# Patient Record
Sex: FEMALE | URBAN - METROPOLITAN AREA
[De-identification: names, ages, dates, MRNs, and addresses within clinical notes are randomized per-mention and may not be internally consistent; named-entity substitution may affect disease eponyms.]

---

## 2022-06-14 ENCOUNTER — APPOINTMENT (RX ONLY)
Dept: URBAN - METROPOLITAN AREA CLINIC 119 | Facility: CLINIC | Age: 27
Setting detail: DERMATOLOGY
End: 2022-06-14

## 2022-06-14 DIAGNOSIS — Z41.9 ENCOUNTER FOR PROCEDURE FOR PURPOSES OTHER THAN REMEDYING HEALTH STATE, UNSPECIFIED: ICD-10-CM

## 2022-06-14 PROCEDURE — ? FILLERS

## 2022-06-14 ASSESSMENT — LOCATION SIMPLE DESCRIPTION DERM
LOCATION SIMPLE: LEFT UPPER LIP
LOCATION SIMPLE: LEFT LIP
LOCATION SIMPLE: RIGHT UPPER LIP
LOCATION SIMPLE: RIGHT LOWER LIP
LOCATION SIMPLE: LEFT LOWER LIP

## 2022-06-14 ASSESSMENT — LOCATION DETAILED DESCRIPTION DERM
LOCATION DETAILED: RIGHT INFERIOR VERMILION BORDER
LOCATION DETAILED: LEFT UPPER CUTANEOUS LIP
LOCATION DETAILED: LEFT SUPERIOR VERMILION BORDER
LOCATION DETAILED: LEFT INFERIOR VERMILION BORDER
LOCATION DETAILED: RIGHT SUPERIOR VERMILION BORDER

## 2022-06-14 ASSESSMENT — LOCATION ZONE DERM: LOCATION ZONE: LIP

## 2022-06-14 NOTE — PROCEDURE: FILLERS
[facility]erm[facility] Lips Filler Volume In Cc: 0
Number Of Syringes (Required For Inventory): 1
Use Map Statement For Sites (Optional): No
Consent: Written consent obtained. Risks include but not limited to bruising, beading, irregular texture, ulceration, infection, allergic reaction, scar formation, incomplete augmentation, temporary nature, procedural pain.
Post-Care Instructions: Patient instructed to apply ice to reduce swelling.
Inventory Information: This plan will send filler information to inventory based on the fillers you select. Multiple fillers can be sent but you must ensure you select the appropriate fillers in the inventory plan.
Map Statment: See [address] for Complete Details
Additional Area 1 Location: oral commissures
Detail Level: Detailed
Filler: Restylane Kysse
Show Inventory Tab: Show

## 2022-07-26 ENCOUNTER — APPOINTMENT (RX ONLY)
Dept: URBAN - METROPOLITAN AREA CLINIC 116 | Facility: CLINIC | Age: 27
Setting detail: DERMATOLOGY
End: 2022-07-26

## 2022-07-26 DIAGNOSIS — L81.8 OTHER SPECIFIED DISORDERS OF PIGMENTATION: ICD-10-CM

## 2022-07-26 PROCEDURE — ? LASER TATTOO REMOVAL

## 2022-07-26 PROCEDURE — ? DIAGNOSIS COMMENT

## 2022-07-26 ASSESSMENT — LOCATION SIMPLE DESCRIPTION DERM: LOCATION SIMPLE: RIGHT PRETIBIAL REGION

## 2022-07-26 ASSESSMENT — LOCATION ZONE DERM: LOCATION ZONE: LEG

## 2022-07-26 ASSESSMENT — LOCATION DETAILED DESCRIPTION DERM: LOCATION DETAILED: RIGHT LATERAL DISTAL PRETIBIAL REGION

## 2022-07-26 NOTE — HPI: COSMETIC (LASER TATTOO REMOVAL)
Have You Had Laser Tattoo Removal Before?: has not had previous treatments
When Outside In The Sun, Do You...: rarely burns, mostly tans

## 2022-07-26 NOTE — PROCEDURE: LASER TATTOO REMOVAL
Price (Use Numbers Only, No Special Characters Or $): 0
Detail Level: Detailed
Tattoo Color Treated: Black
Treatment Number: 1
Laser Type: Q-switched Nd:Yag 1064nm
Spot Size In Mm: 6
Fluence: 2.5
Anesthesia Type: 1% lidocaine with epinephrine
Anesthesia Volume In Cc: 3
Pre-Procedure Text: The treatment areas were cleaned and treated with the laser parameters noted above.
Post-Procedure Text: Vaseline and ice applied. Post care reviewed with patient.
Endpoint Text: Immediate endpoint: skin whitening and pinpoint bleeding.
Spot Size In Mm: 2
Laser Type: Q-switched Alexandrite 755nm
Consent: Written consent obtained, risks reviewed including but not limited to crusting, scabbing, blistering, scarring, darker or lighter pigmentary change, systemic reactions, ulceration, incidental hair removal, bruising, and/or incomplete removal.
Post-Care Instructions: I reviewed with the patient in detail post-care instructions. Patient should apply vaseline twice daily to tattoo and keep it covered with a non-stick adhesive dressing.

## 2022-08-02 ENCOUNTER — APPOINTMENT (RX ONLY)
Dept: URBAN - METROPOLITAN AREA CLINIC 119 | Facility: CLINIC | Age: 27
Setting detail: DERMATOLOGY
End: 2022-08-02

## 2022-08-02 DIAGNOSIS — Z41.9 ENCOUNTER FOR PROCEDURE FOR PURPOSES OTHER THAN REMEDYING HEALTH STATE, UNSPECIFIED: ICD-10-CM

## 2022-08-02 PROCEDURE — ? DERMAPLANE

## 2022-08-02 PROCEDURE — ? DELUXE HYDRAFACIAL

## 2022-08-02 ASSESSMENT — LOCATION DETAILED DESCRIPTION DERM
LOCATION DETAILED: RIGHT INFERIOR LATERAL FOREHEAD
LOCATION DETAILED: INFERIOR MID FOREHEAD

## 2022-08-02 ASSESSMENT — LOCATION ZONE DERM: LOCATION ZONE: FACE

## 2022-08-02 ASSESSMENT — LOCATION SIMPLE DESCRIPTION DERM
LOCATION SIMPLE: INFERIOR FOREHEAD
LOCATION SIMPLE: RIGHT FOREHEAD

## 2022-08-02 NOTE — PROCEDURE: DERMAPLANE
Blade: 10 blade scalpel
Treatment Areas: face
Treatment Area Prep: acetone
Post-Procedure Instructions: Following the dermaplane procedure, Oxymist treatment was applied to the treatment areas. Moisturizer and SPF was applied.
Pre-Procedure Text: The patient was placed in a recumbant position on the procedure table.
Detail Level: Zone
Post-Care Instructions: I reviewed with the patient in detail post-care instructions.

## 2022-08-02 NOTE — PROCEDURE: DELUXE HYDRAFACIAL
Indication: skin texture
Location: face
Procedure: Exfoliation
Treatment Number: 1
Solution: Activ-4
Tip: Hydropeel Tip, Blue
Vacuum Pressure Low Setting (Will Not Render If Set To 0): 0
Procedure: Peel
Solution: GlySal 7.5%
Tip: Hydropeel Tip, Teal
Procedure: Extraction
Solution: Beta-HD
Procedure: Boost
Solution Override
Tip: Hydropeel Tip, Clear
Procedure: Fusion
Procedure: Extend and Protect
Tip Override
Consent: Written consent obtained, risks reviewed including but not limited to crusting, scabbing, blistering, scarring, darker or lighter pigmentary change, bruising, and/or incomplete response.
Post-Care Instructions: I reviewed with the patient in detail post-care instructions. Patient should stay away from the sun and wear sun protection until treated areas are fully healed.

## 2022-08-17 ENCOUNTER — APPOINTMENT (RX ONLY)
Dept: URBAN - METROPOLITAN AREA CLINIC 119 | Facility: CLINIC | Age: 27
Setting detail: DERMATOLOGY
End: 2022-08-17

## 2022-08-24 ENCOUNTER — APPOINTMENT (RX ONLY)
Dept: URBAN - METROPOLITAN AREA CLINIC 119 | Facility: CLINIC | Age: 27
Setting detail: DERMATOLOGY
End: 2022-08-24

## 2022-09-20 ENCOUNTER — APPOINTMENT (RX ONLY)
Dept: URBAN - METROPOLITAN AREA CLINIC 119 | Facility: CLINIC | Age: 27
Setting detail: DERMATOLOGY
End: 2022-09-20

## 2022-09-20 DIAGNOSIS — L81.8 OTHER SPECIFIED DISORDERS OF PIGMENTATION: ICD-10-CM

## 2022-09-20 PROCEDURE — ? DIAGNOSIS COMMENT

## 2022-09-20 PROCEDURE — ? LASER TATTOO REMOVAL

## 2022-09-20 ASSESSMENT — LOCATION DETAILED DESCRIPTION DERM: LOCATION DETAILED: RIGHT LATERAL DISTAL PRETIBIAL REGION

## 2022-09-20 ASSESSMENT — LOCATION SIMPLE DESCRIPTION DERM: LOCATION SIMPLE: RIGHT PRETIBIAL REGION

## 2022-09-20 ASSESSMENT — LOCATION ZONE DERM: LOCATION ZONE: LEG

## 2022-09-20 NOTE — PROCEDURE: LASER TATTOO REMOVAL
Price (Use Numbers Only, No Special Characters Or $): 0
Detail Level: Detailed
Tattoo Color Treated: Black
Treatment Number: 2
Laser Type: Q-switched Nd:Yag 1064nm
Spot Size In Mm: 4
Fluence: 6
Anesthesia Type: 1% lidocaine with epinephrine
Pre-Procedure Text: The treatment areas were cleaned and treated with the laser parameters noted above.
Post-Procedure Text: Vaseline and ice applied. Post care reviewed with patient.
Endpoint Text: Immediate endpoint: skin whitening and pinpoint bleeding.
Laser Type: Q-switched Alexandrite 755nm
Consent: Written consent obtained, risks reviewed including but not limited to crusting, scabbing, blistering, scarring, darker or lighter pigmentary change, systemic reactions, ulceration, incidental hair removal, bruising, and/or incomplete removal.
Post-Care Instructions: I reviewed with the patient in detail post-care instructions. Patient should apply vaseline twice daily to tattoo and keep it covered with a non-stick adhesive dressing.

## 2023-01-03 ENCOUNTER — RX ONLY (OUTPATIENT)
Age: 28
Setting detail: RX ONLY
End: 2023-01-03

## 2023-01-03 RX ORDER — TRETIONIN 0.5 MG/G
CREAM TOPICAL
Qty: 45 | Refills: 3 | Status: ERX | COMMUNITY
Start: 2023-01-03

## 2023-01-17 ENCOUNTER — APPOINTMENT (RX ONLY)
Dept: URBAN - METROPOLITAN AREA CLINIC 119 | Facility: CLINIC | Age: 28
Setting detail: DERMATOLOGY
End: 2023-01-17

## 2023-01-17 DIAGNOSIS — L81.8 OTHER SPECIFIED DISORDERS OF PIGMENTATION: ICD-10-CM

## 2023-01-17 PROCEDURE — ? LASER TATTOO REMOVAL

## 2023-01-17 PROCEDURE — ? DIAGNOSIS COMMENT

## 2023-01-17 ASSESSMENT — LOCATION SIMPLE DESCRIPTION DERM: LOCATION SIMPLE: RIGHT PRETIBIAL REGION

## 2023-01-17 ASSESSMENT — LOCATION DETAILED DESCRIPTION DERM: LOCATION DETAILED: RIGHT LATERAL DISTAL PRETIBIAL REGION

## 2023-01-17 ASSESSMENT — LOCATION ZONE DERM: LOCATION ZONE: LEG

## 2023-01-17 NOTE — PROCEDURE: LASER TATTOO REMOVAL
Price (Use Numbers Only, No Special Characters Or $): 0
Detail Level: Detailed
Tattoo Color Treated: Black
Treatment Number: 3
Laser Type: Q-switched Nd:Yag 1064nm
Spot Size In Mm: 4
Fluence: 6.2
Anesthesia Type: 1% lidocaine with epinephrine
Anesthesia Volume In Cc: 6
Pre-Procedure Text: The treatment areas were cleaned and treated with the laser parameters noted above.
Post-Procedure Text: Vaseline and ice applied. Post care reviewed with patient.
Endpoint Text: Immediate endpoint: skin whitening and pinpoint bleeding.
Spot Size In Mm: 2
Laser Type: Q-switched Alexandrite 755nm
Consent: Written consent obtained, risks reviewed including but not limited to crusting, scabbing, blistering, scarring, darker or lighter pigmentary change, systemic reactions, ulceration, incidental hair removal, bruising, and/or incomplete removal.
Post-Care Instructions: I reviewed with the patient in detail post-care instructions. Patient should apply vaseline twice daily to tattoo and keep it covered with a non-stick adhesive dressing.

## 2023-04-24 ENCOUNTER — RX ONLY (OUTPATIENT)
Age: 28
Setting detail: RX ONLY
End: 2023-04-24

## 2023-04-24 RX ORDER — KETOCONAZOLE 20 MG/ML
SHAMPOO, SUSPENSION TOPICAL
Qty: 120 | Refills: 5 | Status: ERX | COMMUNITY
Start: 2023-04-24

## 2023-05-09 ENCOUNTER — APPOINTMENT (RX ONLY)
Dept: URBAN - METROPOLITAN AREA CLINIC 119 | Facility: CLINIC | Age: 28
Setting detail: DERMATOLOGY
End: 2023-05-09

## 2023-05-09 DIAGNOSIS — Z41.9 ENCOUNTER FOR PROCEDURE FOR PURPOSES OTHER THAN REMEDYING HEALTH STATE, UNSPECIFIED: ICD-10-CM

## 2023-05-09 PROCEDURE — ? INVENTORY

## 2023-05-09 PROCEDURE — ? BOTOX (U OR CC)

## 2023-05-09 ASSESSMENT — LOCATION DETAILED DESCRIPTION DERM
LOCATION DETAILED: LEFT FOREHEAD
LOCATION DETAILED: LEFT SUPERIOR LATERAL MALAR CHEEK
LOCATION DETAILED: LEFT SUPERIOR VERMILION LIP
LOCATION DETAILED: GLABELLA
LOCATION DETAILED: LEFT INFERIOR VERMILION BORDER
LOCATION DETAILED: RIGHT MEDIAL FOREHEAD
LOCATION DETAILED: SUPERIOR MID FOREHEAD
LOCATION DETAILED: LEFT INFERIOR MEDIAL FOREHEAD
LOCATION DETAILED: RIGHT SUPERIOR FOREHEAD
LOCATION DETAILED: RIGHT INFERIOR MEDIAL FOREHEAD
LOCATION DETAILED: RIGHT SUPERIOR LATERAL MALAR CHEEK
LOCATION DETAILED: RIGHT SUPERIOR VERMILION BORDER
LOCATION DETAILED: LEFT SUPERIOR FOREHEAD
LOCATION DETAILED: RIGHT INFERIOR VERMILION LIP

## 2023-05-09 ASSESSMENT — LOCATION ZONE DERM
LOCATION ZONE: FACE
LOCATION ZONE: LIP

## 2023-05-09 ASSESSMENT — LOCATION SIMPLE DESCRIPTION DERM
LOCATION SIMPLE: RIGHT UPPER LIP
LOCATION SIMPLE: SUPERIOR FOREHEAD
LOCATION SIMPLE: LEFT LIP
LOCATION SIMPLE: GLABELLA
LOCATION SIMPLE: LEFT FOREHEAD
LOCATION SIMPLE: RIGHT FOREHEAD
LOCATION SIMPLE: LEFT CHEEK
LOCATION SIMPLE: LEFT LOWER LIP
LOCATION SIMPLE: RIGHT CHEEK
LOCATION SIMPLE: RIGHT LIP

## 2023-05-09 NOTE — PROCEDURE: BOTOX (U OR CC)
Show Inventory Tab: Show
Detail Level: Zone
Measure In Units Or Cc's?: units
Consent: Written consent obtained. Risks include but not limited to lid/brow ptosis, bruising, swelling, diplopia, temporary effect, incomplete chemical denervation.
Price Per Unit Or Per Cc In $ (Use Numbers Only, No Special Characters Or $): 15
Dilution (U/0.1 Cc): 0.1
Expiration Date (Month Year): 03/2024
Post-Care Instructions: Patient instructed to not lie down for 4 hours and limit physical activity for 24 hours. Patient instructed not to travel by airplane for 48 hours.
Lot #: [id number]
Quantity Per Injection Site (Units): 4
Quantity Per Injection Site (Units): 3
Quantity Per Injection Site (Units): 8
Quantity Per Injection Site (Units): 2

## 2023-05-16 ENCOUNTER — APPOINTMENT (RX ONLY)
Dept: URBAN - METROPOLITAN AREA CLINIC 116 | Facility: CLINIC | Age: 28
Setting detail: DERMATOLOGY
End: 2023-05-16